# Patient Record
Sex: FEMALE | Race: WHITE
[De-identification: names, ages, dates, MRNs, and addresses within clinical notes are randomized per-mention and may not be internally consistent; named-entity substitution may affect disease eponyms.]

---

## 2017-01-02 ENCOUNTER — HOSPITAL ENCOUNTER (EMERGENCY)
Dept: HOSPITAL 45 - C.EDB | Age: 32
Discharge: HOME | End: 2017-01-02
Payer: COMMERCIAL

## 2017-01-02 VITALS
HEIGHT: 67.01 IN | HEIGHT: 67.01 IN | WEIGHT: 216.05 LBS | BODY MASS INDEX: 33.91 KG/M2 | BODY MASS INDEX: 33.91 KG/M2 | WEIGHT: 216.05 LBS

## 2017-01-02 VITALS — SYSTOLIC BLOOD PRESSURE: 118 MMHG | OXYGEN SATURATION: 98 % | HEART RATE: 96 BPM | DIASTOLIC BLOOD PRESSURE: 88 MMHG

## 2017-01-02 VITALS — TEMPERATURE: 98.24 F

## 2017-01-02 DIAGNOSIS — M54.16: Primary | ICD-10-CM

## 2017-01-02 DIAGNOSIS — F41.9: ICD-10-CM

## 2017-01-02 DIAGNOSIS — J45.909: ICD-10-CM

## 2017-01-02 DIAGNOSIS — F32.9: ICD-10-CM

## 2017-01-02 DIAGNOSIS — Z79.899: ICD-10-CM

## 2017-01-02 NOTE — EMERGENCY ROOM VISIT NOTE
History


First contact with patient:  13:04


Chief Complaint:  BACK PAIN


Stated Complaint:  LOW BACK PAIN,L LEG PAIN





History of Present Illness


The patient is a 31 year old female who presents to the Emergency Room with 

complaints of persistent lower back pain radiating into the left lower 

extremity.  The patient reports that she has already had an extensive workup 

for her back.  She most recently saw Dr. Mayfield in [place], along with 

Dr. Rico, both spine surgeons who did not suggest surgical intervention at 

this time.  Dr. Mayfield suggested massage therapy and aqua therapy for 

additional relief.  The patient has undergone prior THU 2 by Dr. Jhaveri.  She 

also was evaluated by the [facility] in October.  She is 

currently taking gabapentin, Lyrica and tramadol for her pain.  The patient 

denies any significant recent worsening symptoms, lower extremity weakness, 

foot drop, bladder/bowel incontinence or saddle paresthesias.  She is here to 

discuss further pain management options, and rates her discomfort a 6 out of 10.





Review of Systems


10 system review was performed and was negative except for pertinent positives 

and negatives as indicated in history of present illness





Past Medical/Surgical History


Medical Problems:


(1) Anxiety


(2) Asthma


(3) Bronchitis


(4) decreased fetal movement


(5) Depression


(6) intrau


Surgical Problems:


(1) History of tubal ligation


(2) History of wisdom tooth extraction








Family History





FH: cancer


FH: kidney disease


FH: seizures





Social History


Smoking Status:  Never Smoker


Alcohol Use:  none


Drug Use:  none


Marital Status:  


Housing Status:  lives with family


Occupation Status:  unemployed





Current/Historical Medications


Scheduled


Bupropion (Wellbutrin-Xl), 300 MG PO DAILY


Buspirone Hcl (Buspar), 15 MG PO BID


Pregabalin (Lyrica), 75 MG PO BID





Scheduled PRN


Clindamycin Hcl (Cleocin), Unknown Dose PO Q6H PRN for Pain


Cyclobenzaprine Hcl (Flexeril), 1 TAB PO TID PRN for Muscle Spasms


Ibuprofen (Motrin), 600 MG PO Q6H PRN for Pain


Oxycodone Ir (Roxicodone Ir), 1-2 TAB PO Q4H PRN for Pain





Allergies


Coded Allergies:  


     Penicillins (Verified  Allergy, Intermediate, HIVES, 1/2/17)


     Citalopram (Unverified  Allergy, Unknown, hives, 1/2/17)





Physical Exam


Vital Signs











  Date Time  Temp Pulse Resp B/P Pulse Ox O2 Delivery O2 Flow Rate FiO2


 


1/2/17 14:12  96 20 118/88 98   


 


1/2/17 12:26 36.8 114 18 140/89 98 Room Air  











Physical Exam


CONSTITUTIONAL:  Healthy and well nourished.  Alert and oriented X 3 with 

positive affect.  She does not appear in any acute distress.


HEENT:  Normocephalic, atraumatic.  Pupils equal, round and reactive.  


NECK:  Full active range of motion without discomfort.


RESPIRATORY:  Clear to auscultation bilaterally with no wheezing, crackles, 

rhonchi or stridor.


CARDIOVASCULAR:  Regular rate and rhythm with no murmurs, rubs or gallops.


GASTROINTESTINAL:  Bowel sounds present in all quadrants.  Soft and nontender 

to palpation.


MUSCULOSKELETAL: Examination shows mild left lower lumbar tenderness to 

palpation.  Negative logroll.  Negative sitting straight leg raise.  Pedal 

pulses are intact.  Ankle plantar/dorsiflexion strength is 5 out of 5 and 

symmetric bilaterally.


INTEGUMENTARY:  No rash or other significant dermatologic conditions noted.


NEUROLOGIC: Lower extremity deep tendon reflexes are 2+ and symmetric 

bilaterally.





Medical Decision & Procedures


ED Course


Patient history and physical exam were performed.  Nurse's notes were reviewed.

  I did review prior office notes from the pain clinic with date of service of 

10/14/16.  It was suggested that the patient continue with her current 

medications, and follow up with Dr. Mayfield for evaluation.  The patient has 

done this, and Dr. Mayfield is suggesting nonsurgical management for now.  At 

this point, I explained to the patient that she really should discuss further 

pain management issues with her PCP and the pain clinic.  She was offered a 

prescription for OxyIR 5 mg as needed for breakthrough pain, and encouraged to 

alternate ibuprofen and Tylenol for baseline pain relief.  I did explain to the 

patient that opioid pain management is not recommended given the chronic nature 

of her pain.  I did discuss the risk for addiction with continued chronic 

ongoing opioid use.  The patient voiced understanding, was happy with plan of 

care, refused any analgesics while in the emergency department, and rated her 

pain a 5 out of 10 at the time of discharge.





Medical Decision


See previous sections





[place] Drug Monitoring Program


Search Results:  patient reviewed within database, no issues identified





Impression





 Primary Impression:  Left lumbar radiculitis





Departure Information


Dispostion


Home / Self-Care





Prescriptions





Oxycodone Ir (Roxicodone Ir) 5 Mg Tab


1-2 TAB PO Q4H Y for Pain, #24 TAB


   For Initial Treatment


   Prov: Sanjiv Tidwell PA         1/2/17





Referrals


Yakov Lam M.D.





Forms


HOME CARE DOCUMENTATION FORM,                                                 

               IMPORTANT VISIT INFORMATION





Patient Instructions


A Signature Page, My [facility], ED Sciatica





Additional Instructions





Continue with your current medications.


Ibuprofen 800 mg and/or Tylenol 1000 mg every 8 hours.


You may also alternate these medications for more effective pain relief:


Ibuprofen --4 HRS--> Tylenol --4 HRS--> ibuprofen --4 HRS--> Tylenol ....


OxyIR if needed for worse pain.


Discuss further treatment options with your PCP and [facility].

## 2017-02-26 ENCOUNTER — HOSPITAL ENCOUNTER (EMERGENCY)
Dept: HOSPITAL 45 - C.EDB | Age: 32
Discharge: HOME | End: 2017-02-26
Payer: COMMERCIAL

## 2017-02-26 VITALS
WEIGHT: 221.34 LBS | BODY MASS INDEX: 34.74 KG/M2 | HEIGHT: 67.01 IN | WEIGHT: 221.34 LBS | HEIGHT: 67.01 IN | BODY MASS INDEX: 34.74 KG/M2

## 2017-02-26 VITALS — OXYGEN SATURATION: 98 % | DIASTOLIC BLOOD PRESSURE: 89 MMHG | HEART RATE: 107 BPM | SYSTOLIC BLOOD PRESSURE: 133 MMHG

## 2017-02-26 VITALS — TEMPERATURE: 98.96 F

## 2017-02-26 DIAGNOSIS — Z79.52: ICD-10-CM

## 2017-02-26 DIAGNOSIS — Z79.899: ICD-10-CM

## 2017-02-26 DIAGNOSIS — M54.42: Primary | ICD-10-CM

## 2017-02-26 DIAGNOSIS — G89.29: ICD-10-CM

## 2017-02-26 NOTE — EMERGENCY ROOM VISIT NOTE
ED Visit Note


First contact with patient:  14:59


CHIEF COMPLAINT:  Low back pain





HISTORY OF PRESENT ILLNESS:  This 31-year-old female patient presents to the 

emergency department complaining of pain in the low back which began worsening 

yesterday.  The pain was gradual in onset, is now constant and worse with 

movement.  The patient notes the pain as dull and a 7/10.  The patient has 

taken Flexeril and tramadol without relief of the pain.  The patient denies any 

loss of control of their bowel or bladder functions.  There has been no leg 

numbness or weakness, and no change in sensation.  No nausea or vomiting or 

abdominal pain.  No chest pain or shortness of breath.   No dysuria or 

increased urinary frequency.  The patient has a history of ongoing back issues.

  She states that she is a surgical candidate, but is not able to undergo 

surgery because of her family situation.  The patient has followed with 

orthospine and pain management.  She states her last MRI was about 2 weeks ago 

which showed bulging of L5-S1 with impingement.  The patient does not have new 

trauma, and feels this is similar to her chronic pain when it is exacerbated.





REVIEW OF SYSTEMS:   A review of systems was performed with positives and 

pertinent negatives listed in the history of present illness. All other systems 

were reviewed and are negative.





ALLERGIES: See EMR





MEDICATIONS: Tramadol and Flexeril





PMH: History of chronic back pain





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


  


PHYSICAL EXAM: 


VITALS: Vitals are noted on the nurse's note and reviewed by myself.  Vital 

signs stable.


GENERAL: White female, in no acute distress, nondiaphoretic, well-developed well

-nourished.


SKIN: The skin was without rashes, erythema, edema, or bruising.  Capillary 

refill less than 2 seconds.    


NECK: Supple without nuchal rigidity.  No cervical spine tenderness.  No 

paraspinous muscle tenderness.    


HEART: Regular rate and rhythm without murmurs gallops or rubs.


LUNGS: Clear to auscultation bilaterally without wheezes, rales or rhonchi.  


ABDOMEN: Positive bowel sounds x 4.  Normal tympanic percussion.  Soft, 

nontender, without masses or organomegaly.  Morris sign negative.


MUSCULOSKELETAL: No muscle atrophy, erythema, or edema noted of the back.  

There is left sided tenderness over the lumbar spinous processes.  There is no 

significant tenderness over the paraspinous muscles bilateral.  There is no 

tenderness over the thoracic spine or paraspinous muscles.  There are no muscle 

spasms present.  The patient is slow to move around with maximum tenderness 

with flexion.  Positive straight leg raise test.


NEURO: Patient was alert and oriented to person place and time.  Normal 

sensation to light and sharp touch.  Deep tendon reflexes 2+ in the lower 

extremities.  Dorsalis pedis pulse 2+ bilaterally.  Strength 5/5 and equal in 

the bilateral lower extremities.





EMERGENCY DEPARTMENT COURSE: Physical exam and history were performed.  Nursing 

notes and EMR were reviewed.  Evidently the patient has a long-standing history 

of back pain, and states that she is a surgical candidate.  She has been seen a 

few times in her lifetime at this facility for back pain.  She states that she 

had an MRI 2 weeks ago, and without acute traumatic injury I do not feel 

additional imaging is necessary at this time.  She certainly does not present 

like cauda equina, spinal abscess, or other acute surgical process.  The 

patient and I had a lengthy discussion regarding options of care.  I will 

provide her a short course of Vicodin and prednisone.  She is to use her 

Flexeril at home.  I did review her profile in the [place] drug monitoring 

program, and she does receive small amounts of tramadol through her primary 

care physician's office.  I do not see any suspicious seeking behavior at this 

time.  The patient was asked to follow-up with her pain management specialist 

and her primary care physician/orthopedic surgeon.  She was invited back to the 

ER anytime with any new, worsening, or concerning symptoms.


Current/Historical Medications


Scheduled


Bupropion (Wellbutrin-Xl), 300 MG PO DAILY


Buspirone Hcl (Buspar), 15 MG PO BID


Prednisone (Prednisone), 0 PO DAILY


Pregabalin (Lyrica), 75 MG PO BID





Scheduled PRN


Clindamycin Hcl (Cleocin), Unknown Dose PO Q6H PRN for Pain


Cyclobenzaprine Hcl (Flexeril), 1 TAB PO TID PRN for Muscle Spasms


Hydrocodone/Acetaminophen 5MG/325MG (Norco 5MG/325MG), 1 TABLET PO Q6 PRN for 

Pain


Ibuprofen (Motrin), 600 MG PO Q6H PRN for Pain


Oxycodone Ir (Roxicodone Ir), 1-2 TAB PO Q4H PRN for Pain





Allergies


Coded Allergies:  


     Penicillins (Verified  Allergy, Intermediate, HIVES, 1/2/17)


     Citalopram (Unverified  Allergy, Unknown, hives, 1/2/17)


     Pregabalin (Verified  Adverse Reaction, Intermediate, Water retention, 

difficulty breathing, 2/26/17)





Vital Signs











  Date Time  Temp Pulse Resp B/P Pulse Ox O2 Delivery O2 Flow Rate FiO2


 


2/26/17 14:44 37.2 123 18 132/91 100 Room Air  











Departure Information


Impression





 Primary Impression:  


 Left-sided low back pain with sciatica





Dispostion


Home / Self-Care





Condition


FAIR





Prescriptions





Prednisone (Prednisone) 20 Mg Tab


0 PO DAILY, #18 TAB


   3 DAILY FOR 3 DAYS, THEN 2 DAILY FOR 3 DAYS, THEN 1 DAILY FOR 3 DAYS.


   Prov: Ferny Gurrola PA-C         2/26/17 


Hydrocodone/Acetaminophen 5MG/325MG (Norco 5MG/325MG)  Tab


1 TABLET PO Q6 Y for Pain, #12 TAB


   For Initial Treatment


   Prov: Ferny Gurrola PA-C         2/26/17





Forms


HOME CARE DOCUMENTATION FORM,                                                 

               IMPORTANT VISIT INFORMATION





Patient Instructions


My [facility]





Additional Instructions





You were seen and evaluated today on an emergency basis only.  This is not a 

substitute for, or an effort to provide, complete comprehensive medical care.  

It is not possible to recognize and treat all injuries or illnesses in a single 

emergency department visit. For this reason it is recommended that you followup 

with your orthopedic spine specialist and pain management specialist for 

ongoing care and evaluation.





For baseline pain relief you may alternate ibuprofen and acetaminophen every 4 

hours for pain control. Take 600 mg ibuprofen (Advil) and then 4 hours later 

take 1000 mg acetaminophen (Tylenol). Do not take more than 3000 mg 

acetaminophen in a single day.  





Norco (hydrocodone/acetaminophen) 5/325 mg every 6 hours as needed for 

worsening breakthrough pain. Do not drink or drive on Norco. This medication 

will likely make you tired. Do not take Norco and Tylenol at the same time as 

both contain acetaminophen. Norco may cause constipation. You may wish to take 

an over-the-counter stool softener like Colace if this occurs.





Take prednisone as prescribed.





Continue Flexeril at home as previously provided





You are welcome to return to the emergency department anytime with new, 

worsening, or concerning symptoms.

## 2017-05-18 ENCOUNTER — HOSPITAL ENCOUNTER (OUTPATIENT)
Dept: HOSPITAL 45 - C.PAPS | Age: 32
Discharge: HOME | End: 2017-05-18
Attending: PHYSICIAN ASSISTANT
Payer: COMMERCIAL

## 2017-05-18 ENCOUNTER — HOSPITAL ENCOUNTER (OUTPATIENT)
Dept: HOSPITAL 45 - C.LAB1850 | Age: 32
Discharge: HOME | End: 2017-05-18
Attending: PHYSICIAN ASSISTANT
Payer: COMMERCIAL

## 2017-05-18 DIAGNOSIS — Z12.4: Primary | ICD-10-CM

## 2017-05-18 DIAGNOSIS — N92.0: Primary | ICD-10-CM

## 2017-05-18 LAB
BASOPHILS # BLD: 0.05 K/UL (ref 0–0.2)
BASOPHILS NFR BLD: 0.8 %
COMPLETE: YES
EOSINOPHIL NFR BLD AUTO: 210 K/UL (ref 130–400)
HCT VFR BLD CALC: 39.7 % (ref 37–47)
IG%: 0.3 %
IMM GRANULOCYTES NFR BLD AUTO: 31.9 %
LYMPHOCYTES # BLD: 2.1 K/UL (ref 1.2–3.4)
MCH RBC QN AUTO: 30.8 PG (ref 25–34)
MCHC RBC AUTO-ENTMCNC: 35 G/DL (ref 32–36)
MCV RBC AUTO: 87.8 FL (ref 80–100)
MONOCYTES NFR BLD: 8.2 %
NEUTROPHILS # BLD AUTO: 6.2 %
NEUTROPHILS NFR BLD AUTO: 52.6 %
PMV BLD AUTO: 10.7 FL (ref 7.4–10.4)
RBC # BLD AUTO: 4.52 M/UL (ref 4.2–5.4)
WBC # BLD AUTO: 6.59 K/UL (ref 4.8–10.8)

## 2017-09-06 ENCOUNTER — HOSPITAL ENCOUNTER (OUTPATIENT)
Dept: HOSPITAL 45 - C.LABBC | Age: 32
Discharge: HOME | End: 2017-09-06
Attending: ORTHOPAEDIC SURGERY
Payer: SELF-PAY

## 2017-09-06 DIAGNOSIS — Z01.812: Primary | ICD-10-CM

## 2017-09-06 LAB
BASOPHILS # BLD: 0.04 K/UL (ref 0–0.2)
BASOPHILS NFR BLD: 0.5 %
COMPLETE: YES
EOSINOPHIL NFR BLD AUTO: 194 K/UL (ref 130–400)
HCT VFR BLD CALC: 40.6 % (ref 37–47)
IG%: 0.3 %
IMM GRANULOCYTES NFR BLD AUTO: 23.3 %
LYMPHOCYTES # BLD: 1.8 K/UL (ref 1.2–3.4)
MCH RBC QN AUTO: 30.4 PG (ref 25–34)
MCHC RBC AUTO-ENTMCNC: 33.7 G/DL (ref 32–36)
MCV RBC AUTO: 90.2 FL (ref 80–100)
MONOCYTES NFR BLD: 7.5 %
NEUTROPHILS # BLD AUTO: 4 %
NEUTROPHILS NFR BLD AUTO: 64.4 %
PMV BLD AUTO: 11.5 FL (ref 7.4–10.4)
RBC # BLD AUTO: 4.5 M/UL (ref 4.2–5.4)
WBC # BLD AUTO: 7.72 K/UL (ref 4.8–10.8)

## 2017-09-18 ENCOUNTER — HOSPITAL ENCOUNTER (INPATIENT)
Dept: HOSPITAL 45 - C.ACU | Age: 32
LOS: 2 days | Discharge: HOME | DRG: 460 | End: 2017-09-20
Attending: ORTHOPAEDIC SURGERY | Admitting: ORTHOPAEDIC SURGERY
Payer: COMMERCIAL

## 2017-09-18 VITALS — HEART RATE: 101 BPM | SYSTOLIC BLOOD PRESSURE: 128 MMHG | DIASTOLIC BLOOD PRESSURE: 80 MMHG | OXYGEN SATURATION: 100 %

## 2017-09-18 VITALS
HEART RATE: 105 BPM | SYSTOLIC BLOOD PRESSURE: 117 MMHG | TEMPERATURE: 98.42 F | DIASTOLIC BLOOD PRESSURE: 61 MMHG | OXYGEN SATURATION: 99 %

## 2017-09-18 VITALS
HEIGHT: 67 IN | BODY MASS INDEX: 30.45 KG/M2 | BODY MASS INDEX: 30.45 KG/M2 | HEIGHT: 67 IN | WEIGHT: 194.01 LBS | BODY MASS INDEX: 30.45 KG/M2 | WEIGHT: 194.01 LBS

## 2017-09-18 VITALS
TEMPERATURE: 98.42 F | OXYGEN SATURATION: 100 % | DIASTOLIC BLOOD PRESSURE: 75 MMHG | SYSTOLIC BLOOD PRESSURE: 114 MMHG | HEART RATE: 118 BPM

## 2017-09-18 VITALS
DIASTOLIC BLOOD PRESSURE: 77 MMHG | SYSTOLIC BLOOD PRESSURE: 125 MMHG | HEART RATE: 116 BPM | OXYGEN SATURATION: 100 % | TEMPERATURE: 98.06 F

## 2017-09-18 VITALS
OXYGEN SATURATION: 100 % | DIASTOLIC BLOOD PRESSURE: 74 MMHG | SYSTOLIC BLOOD PRESSURE: 127 MMHG | TEMPERATURE: 98.06 F | HEART RATE: 111 BPM

## 2017-09-18 VITALS
DIASTOLIC BLOOD PRESSURE: 68 MMHG | HEART RATE: 88 BPM | OXYGEN SATURATION: 100 % | TEMPERATURE: 98.24 F | SYSTOLIC BLOOD PRESSURE: 113 MMHG

## 2017-09-18 VITALS
DIASTOLIC BLOOD PRESSURE: 69 MMHG | OXYGEN SATURATION: 100 % | SYSTOLIC BLOOD PRESSURE: 118 MMHG | TEMPERATURE: 98.06 F | HEART RATE: 97 BPM

## 2017-09-18 VITALS
TEMPERATURE: 97.34 F | SYSTOLIC BLOOD PRESSURE: 100 MMHG | DIASTOLIC BLOOD PRESSURE: 64 MMHG | OXYGEN SATURATION: 100 % | HEART RATE: 99 BPM

## 2017-09-18 VITALS
SYSTOLIC BLOOD PRESSURE: 115 MMHG | HEART RATE: 110 BPM | TEMPERATURE: 98.24 F | DIASTOLIC BLOOD PRESSURE: 58 MMHG | OXYGEN SATURATION: 100 %

## 2017-09-18 VITALS — OXYGEN SATURATION: 100 %

## 2017-09-18 DIAGNOSIS — Z79.899: ICD-10-CM

## 2017-09-18 DIAGNOSIS — F41.9: ICD-10-CM

## 2017-09-18 DIAGNOSIS — M53.2X7: ICD-10-CM

## 2017-09-18 DIAGNOSIS — M51.37: Primary | ICD-10-CM

## 2017-09-18 DIAGNOSIS — F32.9: ICD-10-CM

## 2017-09-18 PROCEDURE — 0ST40ZZ RESECTION OF LUMBOSACRAL DISC, OPEN APPROACH: ICD-10-PCS | Performed by: ORTHOPAEDIC SURGERY

## 2017-09-18 PROCEDURE — 0SG30A1: ICD-10-PCS | Performed by: ORTHOPAEDIC SURGERY

## 2017-09-18 RX ADMIN — ACETAMINOPHEN SCH MLS/HR: 10 INJECTION, SOLUTION INTRAVENOUS at 23:35

## 2017-09-18 RX ADMIN — ACETAMINOPHEN SCH MLS/HR: 10 INJECTION, SOLUTION INTRAVENOUS at 15:47

## 2017-09-18 RX ADMIN — FENTANYL CITRATE PRN MCG: 50 INJECTION, SOLUTION INTRAMUSCULAR; INTRAVENOUS at 12:30

## 2017-09-18 RX ADMIN — SODIUM CHLORIDE SCH MG: 9 INJECTION INTRAMUSCULAR; INTRAVENOUS; SUBCUTANEOUS at 21:48

## 2017-09-18 RX ADMIN — SODIUM CHLORIDE SCH MLS/HR: 900 INJECTION, SOLUTION INTRAVENOUS at 23:34

## 2017-09-18 RX ADMIN — TOPIRAMATE SCH MG: 25 TABLET, FILM COATED ORAL at 21:46

## 2017-09-18 RX ADMIN — FENTANYL CITRATE PRN MCG: 50 INJECTION, SOLUTION INTRAMUSCULAR; INTRAVENOUS at 12:39

## 2017-09-18 RX ADMIN — CLINDAMYCIN PHOSPHATE SCH MLS/HR: 150 INJECTION, SOLUTION INTRAMUSCULAR; INTRAVENOUS at 17:55

## 2017-09-18 RX ADMIN — HYDROMORPHONE HYDROCHLORIDE PRN MG: 1 INJECTION, SOLUTION INTRAMUSCULAR; INTRAVENOUS; SUBCUTANEOUS at 14:53

## 2017-09-18 RX ADMIN — MORPHINE SULFATE PRN MG: 10 INJECTION, SOLUTION INTRAMUSCULAR; INTRAVENOUS at 13:03

## 2017-09-18 RX ADMIN — SODIUM CHLORIDE SCH MG: 9 INJECTION INTRAMUSCULAR; INTRAVENOUS; SUBCUTANEOUS at 15:48

## 2017-09-18 RX ADMIN — SODIUM CHLORIDE SCH MLS/HR: 900 INJECTION, SOLUTION INTRAVENOUS at 14:50

## 2017-09-18 RX ADMIN — HYDROMORPHONE HYDROCHLORIDE PRN MG: 1 INJECTION, SOLUTION INTRAMUSCULAR; INTRAVENOUS; SUBCUTANEOUS at 20:02

## 2017-09-18 RX ADMIN — FENTANYL CITRATE PRN MCG: 50 INJECTION, SOLUTION INTRAMUSCULAR; INTRAVENOUS at 12:19

## 2017-09-18 RX ADMIN — FENTANYL CITRATE PRN MCG: 50 INJECTION, SOLUTION INTRAMUSCULAR; INTRAVENOUS at 12:24

## 2017-09-18 RX ADMIN — DEXAMETHASONE SODIUM PHOSPHATE SCH MLS/MIN: 4 INJECTION, SOLUTION INTRA-ARTICULAR; INTRALESIONAL; INTRAMUSCULAR; INTRAVENOUS; SOFT TISSUE at 17:52

## 2017-09-18 RX ADMIN — MORPHINE SULFATE PRN MG: 10 INJECTION, SOLUTION INTRAMUSCULAR; INTRAVENOUS at 13:08

## 2017-09-18 NOTE — DIAGNOSTIC IMAGING REPORT
SPINE ONE VIEW, ANY LEVEL



CLINICAL HISTORY: L5-S1 FUSION/INTERBODY    



COMPARISON STUDY:  Lumbar spine MRI February 3, 2017.



Fluoroscopy time: 13 seconds.



FINDINGS: Single lateral fluoroscopic image demonstrates an L5-S1 discectomy

with interbody spacer placement. There are pedicle screws at the L5 and S1

levels.



IMPRESSION:  Lateral fluoroscopic image demonstrating an L5-S1 discectomy with

L5 and S1 pedicle screws.









Electronically signed by:  Akira Mccrary M.D.

9/18/2017 11:44 AM



Dictated Date/Time:  9/18/2017 11:43 AM

## 2017-09-18 NOTE — ANESTHESIOLOGY PROGRESS NOTE
Anesthesia Post Op Note


Date & Time


Sep 18, 2017 at 14:29





Vital Signs





Vital Signs Past 12 Hours








  Date Time  Temp Pulse Resp B/P (MAP) Pulse Ox O2 Delivery O2 Flow Rate FiO2


 


9/18/17 14:23     100 Nasal Cannula 4.0 


 


9/18/17 13:50     100 Nasal Cannula 4.0 


 


9/18/17 13:50 36.7 116 16 125/77 (93) 100 Nasal Cannula 4.0 


 


9/18/17 13:35  118 18 130/71 100 Nasal Cannula 4 


 


9/18/17 13:20 36.4 121 18 133/83 100 Nasal Cannula 4 


 


9/18/17 13:10  121 18 125/78 100 Oxymask 10 


 


9/18/17 13:00  118 18 129/71 100 Nasal Cannula 4 


 


9/18/17 12:50  106 18 137/71 100 Oxymask 10 


 


9/18/17 12:40  85 18 150/62 100 Oxymask 10 


 


9/18/17 12:30 36.4 110 18 143/75 100 Oxymask 10 


 


9/18/17 12:20  113 18 132/67 100 Oxymask 10 


 


9/18/17 12:14 36.1 127 18 108/65 100 Oxymask 10 


 


9/18/17 08:08 36.9 118 18 114/75 (88) 100 Room Air  











Notes


Mental Status:  alert / awake / arousable, participated in evaluation


Pt Amnestic to Procedure:  Yes


Nausea / Vomiting:  adequately controlled


Pain:  adequately controlled


Airway Patency, RR, SpO2:  stable & adequate


BP & HR:  stable & adequate


Hydration State:  stable & adequate


Anesthetic Complications:  no major complications apparent

## 2017-09-18 NOTE — HISTORY AND PHYSICAL
History & Physical


Date


Sep 18, 2017.





Chief Complaint


Back and lower extremity difficulty paresthesias inability to function and 

inability to lift and move her children and function appropriately





History of Present Illness


The patient is a 32 year old female with complaints of back and significant 

lower extremity difficulty mostly back pain dominant over leg pain





Past Medical/Surgical History


Medical Problems:


(1) Anxiety


(2) Asthma


(3) Bronchitis


(4) decreased fetal movement


(5) Depression


(6) intrau


Surgical Problems:


(1) History of tubal ligation


(2) History of wisdom tooth extraction





Additional History


Hepatic Disease:  No


Endocrine Disorder:  No


Kidney Disease:  No


Hypertension:  No


Heart Disease:  No


Bleeding Tendencies:  No


Infectious Diseases:  No





Allergies


Coded Allergies:  


     Penicillins (Verified  Allergy, Intermediate, HIVES, 9/18/17)


     Citalopram (Unverified  Allergy, Unknown, hives, 9/18/17)


     Pregabalin (Verified  Adverse Reaction, Intermediate, Water retention, 

difficulty breathing, 9/18/17)





Home Medications


Scheduled


Bupropion Hcl (Bupropion Hcl Xl), 150 MG PO QAM


Buspirone Hcl (Buspirone Hcl), 5 MG PO BID


Topiramate (Topamax), 50 MG PO BID





Scheduled PRN


Albuterol (Ventolin Hfa), 2 PUFFS INH UD PRN for SOB/Wheezing


Cyclobenzaprine Hcl (Flexeril), 15 MG PO TID PRN for Muscle Spasm


Hydrocodone/Acetaminophen 10MG/325MG (Norco 10MG/325MG), 1-2 TABS PO Q6H PRN 

for Pain


Ibuprofen (Advil), 400-600 MG PO Q6H PRN for Pain





Physical Examination


Skin:  warm/dry


Eyes:  normal inspection


ENT:  normal ENT inspection


Head:  normocephalic


Neck:  supple


Respiratory/Chest:  lungs clear


Cardiovascular:  regular rate, rhythm


Abdomen / GI:  normal bowel sounds


Back:  normal inspection


Extremities:  normal inspection


Genitourinary - Female:  external genitalia normal


Neurologic/Psych:  no motor/sensory deficits





Diagnosis


Degenerative and unstable disc segment of the spine at L5-S1


ASA Classification:  ASA Class II





Plan of Treatment


Posterior lumbar interbody fusion L5-S1

## 2017-09-18 NOTE — OPERATIVE REPORT
DATE OF OPERATION:  09/18/2017

 

PREOPERATIVE DIAGNOSIS:  Instability, lumbar spine L5-S1, severely

degenerative disk L5-S1, lumbar spine.

 

PROCEDURE:  Posterior lumbar interbody fusion, L5-S1, decompression of nerve

roots.

 

We used 2 bilateral cage implants and polyethylene ketone placed in the

interval at L5-S1 locked down with a pedicle screw construct single level, L5

to the sacrum.  We also bone grafted out over the transverse processes to

complete the 360 fusion.

 

SURGEON:  Dr. Wharton.

 

ASSISTANT:  Eduard Edmond PA-C.

 

COMPLICATIONS:  Zero.

 

BLOOD LOSS:  Less than 50.

 

COMORBIDITIES:  Included obesity.

 

DESCRIPTION OF PROCEDURE:  The patient was taken to the operating room, a

general intubated anesthetic provided to the patient, placed prone, scrubbed,

prepped and draped sterile.

 

We commenced with surgery, we made a skin incision and fascia incision.  We

came down right at the interspace of L5-S1, putting in deep self-retaining

retractor.  We decompressed the neural elements which will be the 5th and S1

nerve roots bilaterally taking off ligamentum and lamina, doing a complete

laminectomy, partial facetectomy.

 

We safely instrumented the spine.  I was able to get pedicle screws safely

placed and anatomically located at the sacrum and L5 on the left and sacrum

and L5 on the right, very pleased with the positioning.

 

We then retracted the dura left side, right side and did an interbody bone

grafting, complete discectomy was offered, plus an interbody device placed at

L5-S1 bilateral.

 

We tightened down the construct.  We bone grafted out over the transverse

processes.  Prior to this, we had irrigated with approximately 600 mL of

fluid.  We spread vancomycin in the deep layer and the superficial layer,

closed in layers over a Hemovac drain.  Sterile dressings applied.  The

patient returned to PACU stable.

 

Sponge and needle count correct.

 

No complications.

 

 

I attest to the content of the Intraoperative Record and any orders documented therein. Any exception
s are noted below.

## 2017-09-18 NOTE — [facility] POST OPERATIVE BRIEF NOTE
Immediate Operative Summary


Operative Date


Sep 18, 2017.





Pre-Operative Diagnosis





Degenerative and unstable disc segment of the spine at L5-S1





Post-Operative Diagnosis





Degenerative and unstable disc segment of the spine at L5-S1





Procedure(s) Performed





L5-S1 Posterior Lumbar Interbody Fusion





Surgeon


Dr. Wharton





Assistant Surgeon(s)


dEuard Edmond PA-C





Estimated Blood Loss


50ml





Findings


instability





Specimens





none per surgeon





Disposition


Recovery Room / PACU

## 2017-09-19 VITALS
SYSTOLIC BLOOD PRESSURE: 128 MMHG | OXYGEN SATURATION: 98 % | HEART RATE: 80 BPM | TEMPERATURE: 98.24 F | DIASTOLIC BLOOD PRESSURE: 77 MMHG

## 2017-09-19 VITALS
DIASTOLIC BLOOD PRESSURE: 69 MMHG | HEART RATE: 78 BPM | SYSTOLIC BLOOD PRESSURE: 111 MMHG | OXYGEN SATURATION: 97 % | TEMPERATURE: 97.7 F

## 2017-09-19 VITALS
OXYGEN SATURATION: 100 % | HEART RATE: 105 BPM | TEMPERATURE: 98.24 F | DIASTOLIC BLOOD PRESSURE: 63 MMHG | SYSTOLIC BLOOD PRESSURE: 104 MMHG

## 2017-09-19 VITALS
TEMPERATURE: 98.24 F | HEART RATE: 116 BPM | OXYGEN SATURATION: 100 % | SYSTOLIC BLOOD PRESSURE: 112 MMHG | DIASTOLIC BLOOD PRESSURE: 65 MMHG

## 2017-09-19 VITALS
OXYGEN SATURATION: 100 % | HEART RATE: 110 BPM | SYSTOLIC BLOOD PRESSURE: 105 MMHG | DIASTOLIC BLOOD PRESSURE: 69 MMHG | TEMPERATURE: 97.52 F

## 2017-09-19 RX ADMIN — HYDROMORPHONE HYDROCHLORIDE PRN MG: 1 INJECTION, SOLUTION INTRAMUSCULAR; INTRAVENOUS; SUBCUTANEOUS at 08:16

## 2017-09-19 RX ADMIN — DEXAMETHASONE SODIUM PHOSPHATE SCH MLS/MIN: 4 INJECTION, SOLUTION INTRA-ARTICULAR; INTRALESIONAL; INTRAMUSCULAR; INTRAVENOUS; SOFT TISSUE at 18:39

## 2017-09-19 RX ADMIN — DEXAMETHASONE SODIUM PHOSPHATE SCH MLS/MIN: 4 INJECTION, SOLUTION INTRA-ARTICULAR; INTRALESIONAL; INTRAMUSCULAR; INTRAVENOUS; SOFT TISSUE at 09:10

## 2017-09-19 RX ADMIN — ACETAMINOPHEN SCH MLS/HR: 10 INJECTION, SOLUTION INTRAVENOUS at 08:17

## 2017-09-19 RX ADMIN — Medication SCH GM: at 08:17

## 2017-09-19 RX ADMIN — SODIUM CHLORIDE SCH MG: 9 INJECTION INTRAMUSCULAR; INTRAVENOUS; SUBCUTANEOUS at 09:11

## 2017-09-19 RX ADMIN — TOPIRAMATE SCH MG: 25 TABLET, FILM COATED ORAL at 20:52

## 2017-09-19 RX ADMIN — HYDROMORPHONE HYDROCHLORIDE PRN MG: 1 INJECTION, SOLUTION INTRAMUSCULAR; INTRAVENOUS; SUBCUTANEOUS at 15:28

## 2017-09-19 RX ADMIN — SODIUM CHLORIDE SCH MG: 9 INJECTION INTRAMUSCULAR; INTRAVENOUS; SUBCUTANEOUS at 03:53

## 2017-09-19 RX ADMIN — CLINDAMYCIN PHOSPHATE SCH MLS/HR: 150 INJECTION, SOLUTION INTRAMUSCULAR; INTRAVENOUS at 01:51

## 2017-09-19 RX ADMIN — SODIUM CHLORIDE SCH MG: 9 INJECTION INTRAMUSCULAR; INTRAVENOUS; SUBCUTANEOUS at 15:42

## 2017-09-19 RX ADMIN — TOPIRAMATE SCH MG: 25 TABLET, FILM COATED ORAL at 08:18

## 2017-09-19 RX ADMIN — DEXAMETHASONE SODIUM PHOSPHATE SCH MLS/MIN: 4 INJECTION, SOLUTION INTRA-ARTICULAR; INTRALESIONAL; INTRAMUSCULAR; INTRAVENOUS; SOFT TISSUE at 01:51

## 2017-09-19 RX ADMIN — BUPROPION HYDROCHLORIDE SCH MG: 150 TABLET, FILM COATED, EXTENDED RELEASE ORAL at 08:18

## 2017-09-19 RX ADMIN — ACETAMINOPHEN SCH MLS/HR: 10 INJECTION, SOLUTION INTRAVENOUS at 15:42

## 2017-09-19 NOTE — DISCHARGE INSTRUCTIONS
Discharge Instructions


Date of Service


Sep 19, 2017.





Admission


Reason for Admission:  Degenerative Disc Disease





Discharge


Discharge Diagnosis / Problem:  instability L5-S1





Discharge Goals


Goal(s):  Improve function





Activity Recommendations


Activity Limitations:  as noted below


Lifting Limitations:  until after follow-up appointment


Exercise/Sports Limitations:  until after follow-up appointment


May Resume Sexual Activity:  after follow-up appointment


Shower/Bathe:  keep incision dry





.





Instructions / Follow-Up


Instructions / Follow-Up


MEDICATIONS:





Please take your prescriptions as instructed at your pre-op appointment.








SPECIAL CARE:





The following information is intended to answer some of the common questions 

and concerns regarding your surgery.  


Each patient is an individual and receives individual counselling throughout 

the course of treatment, from diagnosis to surgery all the way through 

recovery.  


What follows is not an exhaustive list, but should be a useful guide to some of 

the common questions and concerns patients have regarding their surgeries.


These are not provided to keep you from calling us; rather, they give you 

something accurate and concrete to reference as you recover from your procedure.


 If you need us, we are available to you.





As always, if you are not sure about something, call us at [phone].





MEDICAL EMERGENCIES:





For these conditions, call 911 or go to your local hospital-based Emergency 

Department - not [facility] or equivalent.





*  Paralysis


*  Severe chest pain or difficulty breathing


*  Swelling or redness of either leg





Spine procedures can be rather complex and though complications are rare, they 

do occur.  In such cases, effective advice regarding emergency situations 

cannot always 


be addressed over the telephone.  You may be referred to the emergency 

department for more effective management of your problem.





Activity Limitations:





It is important to give your body time to heal, so please limit your activities

:  





* In general, don't do anything that moves your spine too much.  You should 

avoid contact sports, twisting or heavy lifting while you recover.





* 5-10 pounds is all you should attempt to lift.





* You should not plan on driving for approximately 3 weeks and you should avoid

  traveling more than 30-45 minutes at a time.  


   Longer trips should be broken down with walking breaks spaced appropriately.





* Physical therapy is not usually required.





* Walking and good posture practices will help you recover and regain your 

function.





* Avoid straining or sudden changes in position.





* In general, the goal is to take it easy and recover.  Don't cause any new 

problems.  Just relax.





Showers:





* Do not take a bath, use a Jacuzzi or hot tub or otherwise submerge your 

incision.


 


* It is usually safe to take a shower 4-5 days after your surgery.





* Your incision does not require any special creams or ointments.





* Simply clean it with soap and water, dry and re-dress with a clean bandage 

afterwards.





Incision:





* Keep incision clean, dry and protected until your first follow-up appointment.





* Some amount of drainage and redness is normal.  Any drainage should be fairly 

clear and not have a foul odor.





* If you feel anything is wrong or you have excessive drainage, please call us.





* Your stitches and staples will be removed 10-14 days after your surgery.  At 

the  time of your first post-op visit.





* Neck surgeries are typically closed with a suture underneath the skin.  The 

steri-strips over the incision should be maintained until we see you in the 

office.





Bracing:





* You may be provided with a back or neck brace to encourage good posture and  

prevent injury.  


   It will remind you not to do too much as you heal and will alert others to 

the fact that you have had a surgery.





* Back braces may be removed for showers and when you are resting at home.  

They must be worn when you are walking around for any period of time or for 

travel.





* For neck surgery, you will likely be provided with two cervical collars.  The 

soft collar  (Aspen or foam rubber) is worn most commonly throughout the day 

and while sleeping.


  The plastic collar (provided at the hospital) is for showering/bathing.





* Except while eating, collars should remain in place.  More specifically, 

bracing is provided for a purpose and should be worn.





* Please obtain your brace or collars prior to your operation and bring them to 

the hospital with you on the day of surgery. 





*  You should also bring your collars to your post-op appointment with Dr. Wharton.








You should always take good care of your body and practice healthy habits, 

especially following surgery. 





You should:





* Follow your doctor's treatment plan





* Sit and stand properly with good posture (ears over shoulders, shoulders over 

hips)   Don't slouch





* Learn to lift correctly





* Exercise regularly (low-impact aerobic exercise is especially good, but check 

with your doctor first)





* Generally, be up and walking for 5-10 minutes at a time at least 3-4 times 

per day from the day you get home





* Increasing walking to tolerance until you can walk for 20-30 minutes at a time





* Attain and maintain a healthy body weight





* Eat healthy foods ( a well-balanced, low-fat diet rich in fruits and 

vegetables) and get enough calcium





* Avoid excessive use of alcohol





When to call our office - If you notice any of the following:





* Increased pain not relieve by pain medicine





* Fevers greater then 100 degrees F, chills or flu symptoms





* Increased redness around incision





* Drainage from the incision that is not clear


* Any foul smelling drainage





* Swelling or fluid collection beneath the skin





Miscellaneous:





* In the hospital, you may be given a walker or cane for support while walking.

  These


  are temporary needs and are intended to prevent injuries due to falls.  You 

may 


  discontinue them when you feel strong and steady enough on your feet.





* Sleep in a comfortable position.  We find that many patients find a lounge 

chair or 


  recliner with several pillows to be beneficial in the early post-operative 

period.





* The support stockings should be used for 7-10 days and may be discontinued


  when you are back to walking more and conducting usual household activities.





No problem is insignificant.  We are here to help you and get you well.  


Contact us at [phone].





Definitions:





Foraminotomy: If part of the disc or a bone spur (osteophyte) is pressing on a 

nerve 


as it leaves the vertebra (through an exit called the foramen), a foraminotomy 

may be


done.  Otomy means "to make an opening."  A foraminotomy is making the opening 


of the foramen larger, so the nerve can exit without being compressed.





Laminotomy: Similar to the foraminotomy, a laminotomy makes a larger opening, 


this time in your bony plate protecting your spinal canal and spinal cord (the 

lamina).


The lamina may be pressing on your nerve, so the surgeon may make more room for


the nerves using a laminotomy.





Laminectomy: Sometimes, a laminotomy is not sufficient.  The surgeon may need to


remove all or part of the lamina.  This procedure is called a laminectomy.  

This can 


often be done at many levels without any harmful effects.





Current Hospital Diet


Patient's current hospital diet: Regular Diet





Discharge Diet


Recommended Diet:  Regular Diet





Procedures


Procedures Performed:  


L5-S1 Posterior Lumbar Interbody Fusion





Pending Studies


Studies pending at discharge:  no





Medical Emergencies








.


Who to Call and When:





Medical Emergencies:  If at any time you feel your situation is an emergency, 

please call 911 immediately.





.





Non-Emergent Contact


Non-Emergency issues call your:  Surgeon


.








"Provider Documentation" section prepared by Natalio Wharton.








.





VTE Core Measure


Inpt VTE Proph given/why not?:  Treatment not indicated

## 2017-09-19 NOTE — ANESTHESIOLOGY PROGRESS NOTE
Anesthesia Post Op Note


Date & Time


Sep 19, 2017 at 08:58





Vital Signs


Pain Intensity:  6.0





Vital Signs Past 12 Hours








  Date Time  Temp Pulse Resp B/P (MAP) Pulse Ox O2 Delivery O2 Flow Rate FiO2


 


9/19/17 07:59      Room Air  


 


9/19/17 07:39 36.8 120 14 128/77 (94) 98 Room Air  





  80      


 


9/19/17 03:38 36.8 105 16 104/63 (77) 100 Room Air  


 


9/18/17 23:40      Room Air  


 


9/18/17 22:58 36.9 105 14 117/61 (79) 99 Room Air  











Notes


Mental Status:  alert / awake / arousable, participated in evaluation


Pt Amnestic to Procedure:  Yes


Nausea / Vomiting:  adequately controlled


Pain:  adequately controlled


Airway Patency, RR, SpO2:  stable & adequate


BP & HR:  stable & adequate


Hydration State:  stable & adequate


Anesthetic Complications:  no major complications apparent

## 2017-09-20 VITALS
TEMPERATURE: 98.06 F | OXYGEN SATURATION: 97 % | DIASTOLIC BLOOD PRESSURE: 69 MMHG | SYSTOLIC BLOOD PRESSURE: 118 MMHG | HEART RATE: 103 BPM

## 2017-09-20 VITALS
HEART RATE: 103 BPM | DIASTOLIC BLOOD PRESSURE: 69 MMHG | SYSTOLIC BLOOD PRESSURE: 118 MMHG | OXYGEN SATURATION: 97 % | TEMPERATURE: 98.06 F

## 2017-09-20 RX ADMIN — Medication SCH GM: at 08:38

## 2017-09-20 RX ADMIN — BUPROPION HYDROCHLORIDE SCH MG: 150 TABLET, FILM COATED, EXTENDED RELEASE ORAL at 08:38

## 2017-09-20 RX ADMIN — HYDROMORPHONE HYDROCHLORIDE PRN MG: 1 INJECTION, SOLUTION INTRAMUSCULAR; INTRAVENOUS; SUBCUTANEOUS at 10:15

## 2017-09-20 RX ADMIN — TOPIRAMATE SCH MG: 25 TABLET, FILM COATED ORAL at 09:17

## 2017-09-20 RX ADMIN — ACETAMINOPHEN SCH MLS/HR: 10 INJECTION, SOLUTION INTRAVENOUS at 08:39

## 2017-09-20 RX ADMIN — ACETAMINOPHEN SCH MLS/HR: 10 INJECTION, SOLUTION INTRAVENOUS at 00:09

## 2017-09-20 RX ADMIN — HYDROMORPHONE HYDROCHLORIDE PRN MG: 1 INJECTION, SOLUTION INTRAMUSCULAR; INTRAVENOUS; SUBCUTANEOUS at 00:10

## 2017-09-20 RX ADMIN — HYDROMORPHONE HYDROCHLORIDE PRN MG: 1 INJECTION, SOLUTION INTRAMUSCULAR; INTRAVENOUS; SUBCUTANEOUS at 06:23

## 2017-09-20 RX ADMIN — DEXAMETHASONE SODIUM PHOSPHATE SCH MLS/MIN: 4 INJECTION, SOLUTION INTRA-ARTICULAR; INTRALESIONAL; INTRAMUSCULAR; INTRAVENOUS; SOFT TISSUE at 01:05

## 2017-09-27 NOTE — DISCHARGE SUMMARY
She is improved, stable.  Rounded on each and every day.

 

OBJECTIVE:  Vital signs are stable.  Alert, oriented.  Mentation normal, no

confusion.

 

No chest pain or shortness of breath.

 

ASSESSMENT:  Status post lumbar spine reconstructive surgery.

 

DISPOSITION:  She will be discharged home.  Instructions, precautions

provided.  Medication provided.  She has a followup appointment. 

Instructions given in the office and here at the hospital.

## 2018-02-17 ENCOUNTER — HOSPITAL ENCOUNTER (EMERGENCY)
Dept: HOSPITAL 45 - C.EDB | Age: 33
Discharge: HOME | End: 2018-02-17
Payer: COMMERCIAL

## 2018-02-17 VITALS — TEMPERATURE: 97.88 F

## 2018-02-17 VITALS — DIASTOLIC BLOOD PRESSURE: 73 MMHG | OXYGEN SATURATION: 100 % | HEART RATE: 90 BPM | SYSTOLIC BLOOD PRESSURE: 118 MMHG

## 2018-02-17 VITALS
HEIGHT: 67.01 IN | BODY MASS INDEX: 31.66 KG/M2 | HEIGHT: 67.01 IN | BODY MASS INDEX: 31.66 KG/M2 | WEIGHT: 201.72 LBS | WEIGHT: 201.72 LBS

## 2018-02-17 DIAGNOSIS — Z84.1: ICD-10-CM

## 2018-02-17 DIAGNOSIS — R00.0: ICD-10-CM

## 2018-02-17 DIAGNOSIS — Z82.0: ICD-10-CM
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
## 2018-02-17 DIAGNOSIS — E83.59: ICD-10-CM

## 2018-02-17 DIAGNOSIS — F41.8: ICD-10-CM

## 2018-02-17 DIAGNOSIS — S90.31XA: ICD-10-CM

## 2018-02-17 DIAGNOSIS — N12: Primary | ICD-10-CM

## 2018-02-17 DIAGNOSIS — J45.909: ICD-10-CM

## 2018-02-17 DIAGNOSIS — R11.0: ICD-10-CM

## 2018-02-17 LAB
ALBUMIN SERPL-MCNC: 3.8 GM/DL (ref 3.4–5)
ALP SERPL-CCNC: 86 U/L (ref 45–117)
ALT SERPL-CCNC: 38 U/L (ref 12–78)
AST SERPL-CCNC: 30 U/L (ref 15–37)
BASOPHILS # BLD: 0.05 K/UL (ref 0–0.2)
BASOPHILS NFR BLD: 0.6 %
BUN SERPL-MCNC: 14 MG/DL (ref 7–18)
CALCIUM SERPL-MCNC: 8.3 MG/DL (ref 8.5–10.1)
CO2 SERPL-SCNC: 23 MMOL/L (ref 21–32)
CREAT SERPL-MCNC: 1.04 MG/DL (ref 0.6–1.2)
EOS ABS #: 0.61 K/UL (ref 0–0.5)
EOSINOPHIL NFR BLD AUTO: 189 K/UL (ref 130–400)
GLUCOSE SERPL-MCNC: 101 MG/DL (ref 70–99)
HCT VFR BLD CALC: 37.8 % (ref 37–47)
HGB BLD-MCNC: 13.4 G/DL (ref 12–16)
IG#: 0.03 K/UL (ref 0–0.02)
IMM GRANULOCYTES NFR BLD AUTO: 32.5 %
LIPASE: 129 U/L (ref 73–393)
LYMPHOCYTES # BLD: 2.62 K/UL (ref 1.2–3.4)
MCH RBC QN AUTO: 31.4 PG (ref 25–34)
MCHC RBC AUTO-ENTMCNC: 35.4 G/DL (ref 32–36)
MCV RBC AUTO: 88.5 FL (ref 80–100)
MONO ABS #: 0.55 K/UL (ref 0.11–0.59)
MONOCYTES NFR BLD: 6.8 %
NEUT ABS #: 4.19 K/UL (ref 1.4–6.5)
NEUTROPHILS # BLD AUTO: 7.6 %
NEUTROPHILS NFR BLD AUTO: 52.1 %
PMV BLD AUTO: 10.2 FL (ref 7.4–10.4)
POTASSIUM SERPL-SCNC: 3.4 MMOL/L (ref 3.5–5.1)
PROT SERPL-MCNC: 7.2 GM/DL (ref 6.4–8.2)
RED CELL DISTRIBUTION WIDTH CV: 13 % (ref 11.5–14.5)
RED CELL DISTRIBUTION WIDTH SD: 41.9 FL (ref 36.4–46.3)
SODIUM SERPL-SCNC: 140 MMOL/L (ref 136–145)
WBC # BLD AUTO: 8.05 K/UL (ref 4.8–10.8)

## 2018-02-17 RX ADMIN — MORPHINE SULFATE PRN MG: 4 INJECTION, SOLUTION INTRAMUSCULAR; INTRAVENOUS at 19:13

## 2018-02-17 RX ADMIN — MORPHINE SULFATE PRN MG: 4 INJECTION, SOLUTION INTRAMUSCULAR; INTRAVENOUS at 21:55

## 2018-02-17 NOTE — EMERGENCY ROOM VISIT NOTE
History


First contact with patient:  18:38


Chief Complaint:  ABDOMINAL PAIN


Stated Complaint:  ABD PAIN, POSSIBLE BROKEN R FOOT


Nursing Triage Summary:  


pt reports low abdominal pain X 1 day difficulty urinating 





also 2L bottle of soda fell on R foot





History of Present Illness


The patient is a 32 year old female who presents to the Emergency Room with 

complaints of diffuse abdominal pain has gotten progressively worse over the 

last 4 days.  The pain started on the left side of her abdomen.  It is now 

radiating to the right side.  She describes as a sharp, razor blade sensation 

that is constant.  She has tried ibuprofen with minimal relief of the pain.  

Her last menses was a few days ago.  She has had mild nausea.  No vomiting.  

She has not had a bowel movement in 2 days, but this is normal for her.  She 

denies any fever or chills.  She is having some difficulty urinating.





Review of Systems


10 system review performed and  negative unless noted in HPI or below





Past Medical/Surgical History


Medical Problems:


(1) Anxiety


(2) Asthma


(3) Bronchitis


(4) decreased fetal movement


(5) Degenerative disc disease at L5-S1 level


(6) Depression


(7) intrau


Surgical Problems:


(1) History of tubal ligation


(2) History of wisdom tooth extraction








Family History





FH: cancer


FH: kidney disease


FH: seizures





Social History


Smoking Status:  Never Smoker


Alcohol Use:  none


Drug Use:  none


Marital Status:  


Housing Status:  lives with family


Occupation Status:  unemployed





Current/Historical Medications


Scheduled


Buspirone Hcl (Buspirone Hcl), 5 MG PO BID


Ciprofloxacin Hcl (Cipro), 500 MG PO BID


Gabapentin (Neurontin), 300 MG PO DAILY


Topiramate (Topamax), 100 MG PO BID


Venlafaxine Hcl (Effexor Xr), 1 CAP PO DAILY





Scheduled PRN


Albuterol (Ventolin Hfa), 2 PUFFS INH UD PRN for SOB/Wheezing


Cyclobenzaprine Hcl (Flexeril), 15 MG PO TID PRN for Muscle Spasm


Hydrocodone/Acetaminophen 5MG/325MG (Norco 5MG/325MG), 1-2 TABLET PO Q4H PRN 

for Pain


Ibuprofen (Advil), 400-600 MG PO Q6H PRN for Pain





Physical Exam


Vital Signs











  Date Time  Temp Pulse Resp B/P (MAP) Pulse Ox O2 Delivery O2 Flow Rate FiO2


 


2/17/18 23:20  90 20 118/73 100 Room Air  


 


2/17/18 22:54  99 20 128/83 100 Room Air  


 


2/17/18 21:50  100 20 127/73 100 Room Air  


 


2/17/18 20:30  81 20 127/73 99 Room Air  


 


2/17/18 19:12  102 20 122/68 100 Room Air  


 


2/17/18 18:16 36.6 121 20 129/78 96 Room Air  











Physical Exam


VITALS: Vitals are noted on the nurse's note and reviewed by myself.  Vital 

signs stable.


GENERAL: 32-year-old female, in no acute distress, nondiaphoretic, well-

developed well-nourished.


SKIN: The skin was without rashes, erythema, edema, or bruising. 


HEAD: Normocephalic atraumatic.  


MOUTH: Mucous membranes slightly dry  


NECK: Supple without nuchal rigidity.  No lymphadenopathy. Cervical spine is 

nontender.  No JVD.


HEART: Regular rate and rhythm without murmurs gallops or rubs.


LUNGS: Clear to auscultation bilaterally without wheezes, rales or rhonchi.   

No accessory muscle use.


ABDOMEN: Bowel sounds present, but hypoactive.  Mild left-sided CVA tenderness 

noted..Soft, mild tenderness to palpation in the left lower quadrant, without 

organomegaly. No guarding or rebound tenderness.


MUSCULOSKELETAL: 


Mild ecchymosis noted on the dorsal aspect of the right foot.  Sensation in the 

toes is intact.  Capillary refill less than 2 seconds.  DP pulse +2.  No 

tenderness over the ankle.  Strength 5/5 throughout.


NEURO: Patient was alert and oriented to person place and time.  Normal 

sensation to touch. No focal neurological deficits.





Medical Decision & Procedures


ER Provider


Diagnostic Interpretation:


foot xray


IMPRESSION: No acute fracture or dislocation within the right foot.











Electronically signed by:  Akira Mccrary M.D.


2/17/2018 7:50 PM





Dictated Date/Time:  2/17/2018 7:48 PM





 The status of this report is Signed.   


 Draft = Not yet reviewed or approved by Radiologist.  


 Signed = Reviewed and approved by Radiologist.





renal US





                                                                               

                                                                 








IMPRESSION: 





1. No hydronephrosis.





2. Findings raising the possibility of medullary nephrocalcinosis.











Electronically signed by:  Akira Mccrary M.D.


2/17/2018 10:17 PM





Dictated Date/Time:  2/17/2018 10:15 PM





 The status of this report is Signed.   


 Draft = Not yet reviewed or approved by Radiologist.  


 Signed = Reviewed and approved by Radiologist.


<AttendingPhy></AttendingPhy> <FamilyPhy>Josie Carreno C.R.N.P</FamilyPhy> <

PrimaryPhy>Josie Carreno C.R.N.P</PrimaryPhy> <UnitNumber>[id number]</

UnitNumber> <VisitNumber>[id number]</VisitNumber> <PatientName>ZHOU SIMMS</PatientName> <DateOfBirth>1985</DateOfBirth> <Location>[facility]</Location

> <ServiceDate>02/17/18</ServiceDate> <MNE>ESINDI</MNE> <OrderingPhy>Remedios Cordova PA-C</OrderingPhy> <OrderingPhyMNE>f rep ord dr adkins</OrderingPhyMNE> <

DictatingPhyMNE>f rep dict dr adkins</DictatingPhyMNE> <CCListMNE>f rep ct mne</

CCListMNE> <AdmittingPhyMNE>f pt admit dr adkins</AdmittingPhyMNE> <AttendingPhyMNE

>f pt attend dr adkins</AttendingPhyMNE>


<ConsultingPhyMNE>f pt consult dr adkins</ConsultingPhyMNE> <FamilyPhyMNE>f pt fam 

dr adkins</FamilyPhyMNE> <OtherPhyMNE>f pt other dr adkins</OtherPhyMNE> <

PrimaryPhyMNE>f pt prim care dr adkins</PrimaryPhyMNE> <ReferringPhyMNE>f pt 

referring dr adkins</ReferringPhyMNE>





Laboratory Results


2/17/18 19:10








Red Blood Count 4.27, Mean Corpuscular Volume 88.5, Mean Corpuscular Hemoglobin 

31.4, Mean Corpuscular Hemoglobin Concent 35.4, Mean Platelet Volume 10.2, 

Neutrophils (%) (Auto) 52.1, Lymphocytes (%) (Auto) 32.5, Monocytes (%) (Auto) 

6.8, Eosinophils (%) (Auto) 7.6, Basophils (%) (Auto) 0.6, Neutrophils # (Auto) 

4.19, Lymphocytes # (Auto) 2.62, Monocytes # (Auto) 0.55, Eosinophils # (Auto) 

0.61, Basophils # (Auto) 0.05





2/17/18 19:10

















Test


  2/17/18


19:10 2/17/18


19:15


 


White Blood Count


  8.05 K/uL


(4.8-10.8) 


 


 


Red Blood Count


  4.27 M/uL


(4.2-5.4) 


 


 


Hemoglobin


  13.4 g/dL


(12.0-16.0) 


 


 


Hematocrit 37.8 % (37-47)  


 


Mean Corpuscular Volume


  88.5 fL


() 


 


 


Mean Corpuscular Hemoglobin


  31.4 pg


(25-34) 


 


 


Mean Corpuscular Hemoglobin


Concent 35.4 g/dl


(32-36) 


 


 


Platelet Count


  189 K/uL


(130-400) 


 


 


Mean Platelet Volume


  10.2 fL


(7.4-10.4) 


 


 


Neutrophils (%) (Auto) 52.1 %  


 


Lymphocytes (%) (Auto) 32.5 %  


 


Monocytes (%) (Auto) 6.8 %  


 


Eosinophils (%) (Auto) 7.6 %  


 


Basophils (%) (Auto) 0.6 %  


 


Neutrophils # (Auto)


  4.19 K/uL


(1.4-6.5) 


 


 


Lymphocytes # (Auto)


  2.62 K/uL


(1.2-3.4) 


 


 


Monocytes # (Auto)


  0.55 K/uL


(0.11-0.59) 


 


 


Eosinophils # (Auto)


  0.61 K/uL


(0-0.5) 


 


 


Basophils # (Auto)


  0.05 K/uL


(0-0.2) 


 


 


RDW Standard Deviation


  41.9 fL


(36.4-46.3) 


 


 


RDW Coefficient of Variation


  13.0 %


(11.5-14.5) 


 


 


Immature Granulocyte % (Auto) 0.4 %  


 


Immature Granulocyte # (Auto)


  0.03 K/uL


(0.00-0.02) 


 


 


Anion Gap


  9.0 mmol/L


(3-11) 


 


 


Est Creatinine Clear Calc


Drug Dose 90.2 ml/min 


  


 


 


Estimated GFR (


American) 82.3 


  


 


 


Estimated GFR (Non-


American 71.0 


  


 


 


BUN/Creatinine Ratio 13.8 (10-20)  


 


Calcium Level


  8.3 mg/dl


(8.5-10.1) 


 


 


Total Bilirubin


  0.3 mg/dl


(0.2-1) 


 


 


Aspartate Amino Transf


(AST/SGOT) 30 U/L (15-37) 


  


 


 


Alanine Aminotransferase


(ALT/SGPT) 38 U/L (12-78) 


  


 


 


Alkaline Phosphatase


  86 U/L


() 


 


 


Total Protein


  7.2 gm/dl


(6.4-8.2) 


 


 


Albumin


  3.8 gm/dl


(3.4-5.0) 


 


 


Globulin


  3.4 gm/dl


(2.5-4.0) 


 


 


Albumin/Globulin Ratio 1.1 (0.9-2)  


 


Lipase


  129 U/L


() 


 


 


Human Chorionic Gonadotropin,


Qual NEG (NEG) 


  


 


 


Urine Color  YELLOW 


 


Urine Appearance  CLOUDY (CLEAR) 


 


Urine pH  5.0 (4.5-7.5) 


 


Urine Specific Gravity


  


  1.018


(1.000-1.030)


 


Urine Protein  NEG (NEG) 


 


Urine Glucose (UA)  NEG (NEG) 


 


Urine Ketones  NEG (NEG) 


 


Urine Occult Blood  1+ (NEG) 


 


Urine Nitrite  NEG (NEG) 


 


Urine Bilirubin  NEG (NEG) 


 


Urine Urobilinogen  NEG (NEG) 


 


Urine Leukocyte Esterase  LARGE (NEG) 


 


Urine WBC (Auto)  >30 /hpf (0-5) 


 


Urine RBC (Auto)


  


  5-10 /hpf


(0-4)


 


Urine Hyaline Casts (Auto)


  


  5-10 /lpf


(0-5)


 


Urine Epithelial Cells (Auto)  >30 /lpf (0-5) 


 


Urine Bacteria (Auto)  4+ (NEG) 











Medications Administered











 Medications


  (Trade)  Dose


 Ordered  Sig/Adan


 Route  Start Time


 Stop Time Status Last Admin


Dose Admin


 


 Morphine Sulfate


  (MoRPHine


 SULFATE INJ)  4 mg  Q1H  PRN


 IV  2/17/18 19:00


 2/18/18 00:31 DC 2/17/18 21:55


4 MG


 


 Ondansetron HCl


  (Zofran Inj)  4 mg  Q2H  PRN


 IV  2/17/18 19:00


 2/18/18 00:31 DC 2/17/18 19:13


4 MG


 


 Sodium Chloride  500 ml @ 


 999 mls/hr  Q31M STAT


 IV  2/17/18 18:49


 2/17/18 19:19 DC 2/17/18 19:14


999 MLS/HR


 


 Ciprofloxacin/


 Dextrose


  (Cipro / D5W)  400 mg  NOW  STAT


 IV  2/17/18 20:13


 2/17/18 20:17 DC 2/17/18 20:29


400 MG


 


 Sodium Chloride  500 ml @ 


 999 mls/hr  Q31M STAT


 IV  2/17/18 22:33


 2/17/18 23:03 DC 2/17/18 22:33


999 MLS/HR


 


 Acetaminophen/


 Hydrocodone Bitart


  (Norco 5/325mg


 Home Pack)  1 [facility]  UD  ONCE


 PO  2/17/18 22:45


 2/17/18 22:46 DC 2/17/18 23:20


1 HOMEPACK











ED Course


Patient was seen and examined


Vital signs including  blood pressure were reviewed


medications list was verified with patient


Labs were obtained, and a saline lock was established


The patient was medicated with morphine and Zofran.  She was hydrated with 500 

mL normal saline.


The patient's workup was reviewed.  She was reassessed.  She was more 

comfortable.  We discuss her results.  She voiced understanding.


The patient was given 1 dose of Cipro 400 mg IV.  She did require an additional 

dose of morphine.


The patient was still slightly tachycardic.  She was given an additional 500 mL 

normal saline bolus, which improved her heart rate.


The patient was given a home pack of Norco.


I reviewed discharge instructions the patient.  They voiced understanding and 

had no further questions.





Medical Decision


Differential diagnosis: Ureteral stone, pyelonephritis, UTI, interstitial 

cystitis, pancreatitis, gastritis, bowel obstruction, constipation, foot 

contusion/fracture





At this patient is a 32-year-old female that presents to the emergency 

department with worsening abdominal pain and urinary symptoms over the last 4 

days.  She also dropped a 2 L soda bottle on her foot tonight.  On exam, she 

was nontoxic in appearance.  She had mild left-sided CVA tenderness.  Her 

workup reveals no leukocytosis.  Her renal function is intact.  It does appear 

that she has a UTI.  Given this and her slight CVA tenderness, this is likely a 

pyelonephritis.  I ordered an ultrasound to evaluate her for nephritic stranding

/abscess.  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  There were no other 

acute findings.  She is otherwise healthy.  I believe she is stable to be 

discharged home on oral antibiotics for 2 weeks.  She was comfortable with this 

plan.  She will be given a short course of narcotic pain medication and was 

encouraged to follow-up within the next 24-48 hours with her primary care 

physician.  She also agrees to return to the emergency department with any new, 

worsening or concerning symptoms





This chart was completed in part utilizing Dragon Speech Voice Recognition 

software. Attempts were made to minimize the grammatical errors, random word 

insertions, pronoun errors and incomplete sentences. Any formal questions or 

concerns about the content, text or information contained within the body of 

this dictation should be directly addressed to the provider for clarification.





Medication Reconcilliation


Current Medication List:  was personally reviewed by me





Blood Pressure Screening


Patient's blood pressure:  Normal blood pressure





Impression





 Primary Impression:  


 Pyelonephritis





Departure Information


Dispostion


Home / Self-Care





Condition


FAIR





Prescriptions





Hydrocodone/Acetaminophen 5MG/325MG (Norco 5MG/325MG)  Tab


1-2 TABLET PO Q4H Y for Pain, #10 TAB


   For Initial Treatment


   Prov: Remedios Cordova PA-C         2/17/18 


Ciprofloxacin Hcl (CIPRO) 500 Mg Tab


500 MG PO BID for 14 Days, #28 TAB


   Prov: Remedios Cordova PA-C         2/17/18





Referrals


Josie Carreno C.R.NJAIDA (PCP)





Patient Instructions


My [facility], Pyelonephritis - [facility]





Additional Instructions





You have been evaluated in the emergency department for flank/abdominal pain.  

It appears that you have a urinary tract infection and likely pyelonephritis, 

which is an infection of the kidney.





It is very important for you to finish the entire course of antibiotics





Ibuprofen 600 mg every 8 hours as needed for pain


Norco 1-2 tabs every 4 hours for severe pain.  Do not drink alcohol or drive  

while taking this medication.  This may be taken with ibuprofen, but avoid 

Tylenol.





Please increase fluids over the next several days





Please follow-up with your primary care physician as soon as possible.  Call 

Monday morning for a follow-up appointment.





Please do not hesitate to return to the emergency department with any new, 

worsening or concerning symptoms; especially, fever, worsening pain or vomiting





It was a pleasure participating in your care





School Instructions


Return To School:  2 days

## 2018-02-17 NOTE — DIAGNOSTIC IMAGING REPORT
R FOOT MIN 3 VIEWS ROUTINE



CLINICAL HISTORY: dropped soda bottle on R foot    



COMPARISON: None



FINDINGS:  The tarsometatarsal joints are intact. There is no acute fracture

within the right foot.



IMPRESSION: No acute fracture or dislocation within the right foot.







Electronically signed by:  Akira Mccrary M.D.

2/17/2018 7:50 PM



Dictated Date/Time:  2/17/2018 7:48 PM

## 2018-02-17 NOTE — DIAGNOSTIC IMAGING REPORT
RENAL ULTRASOUND



CLINICAL HISTORY: L flank pain, UTI ? pyelo    



COMPARISON STUDY:  None.



TECHNIQUE:  Sonography of the kidneys and the urinary bladder was performed.



FINDINGS: The right kidney measures 11 x 4.1 x 6.6 cm and the left measures 11.1

x 5 x 6.2 cm. There is no hydronephrosis. There is apparent renal cortical

thinning with increased echogenicity of the renal pyramids. No shadowing calculi

are identified. The right ureteral jet was not visualized. The left ureteral jet

was identified.



IMPRESSION: 



1. No hydronephrosis.



2. Findings raising the possibility of medullary nephrocalcinosis.







Electronically signed by:  Akira Mccrary M.D.

2/17/2018 10:17 PM



Dictated Date/Time:  2/17/2018 10:15 PM

## 2018-02-23 ENCOUNTER — HOSPITAL ENCOUNTER (OUTPATIENT)
Dept: HOSPITAL 45 - C.ULTR | Age: 33
Discharge: HOME | End: 2018-02-23
Attending: NURSE PRACTITIONER
Payer: COMMERCIAL

## 2018-02-23 DIAGNOSIS — R10.31: Primary | ICD-10-CM

## 2018-02-23 NOTE — DIAGNOSTIC IMAGING REPORT
PELVIC ULTRASOUND



CLINICAL HISTORY: Lower quadrant abdominal pain. Evaluate for ectopic pregnancy

versus ovarian cyst.    



COMPARISON STUDY:  Pelvic ultrasound March 4, 2012.



TECHNIQUE:  Transabdominal and transvaginal sonography of the pelvis was

performed.



FINDINGS: The uterus measures 9.3 x 4.9 x 4.9 cm. No intrauterine gestational

sac is noted. Endometrium measures 1.4 cm in thickness. The left ovary was not

visualized. No adnexal mass was identified. The right ovary measures 4 x 2.6 x

3.1 cm and contains a dominant follicle and measures 2.4 cm. There is no free

fluid.



IMPRESSION: 



1. No intrauterine gestational sac or adnexal mass identified. Correlation with

beta hCG levels is recommended. If positive, serial beta hCG levels and a

follow-up pelvic ultrasound are recommended.



2. Nonvisualization of the left ovary.



3. Dominant follicle within the right ovary.







Electronically signed by:  Akira Mccrary M.D.

2/23/2018 3:46 PM



Dictated Date/Time:  2/23/2018 3:43 PM

## 2018-03-11 ENCOUNTER — HOSPITAL ENCOUNTER (EMERGENCY)
Dept: HOSPITAL 45 - C.EDB | Age: 33
Discharge: HOME | End: 2018-03-11
Payer: COMMERCIAL

## 2018-03-11 VITALS — HEART RATE: 88 BPM | DIASTOLIC BLOOD PRESSURE: 80 MMHG | SYSTOLIC BLOOD PRESSURE: 114 MMHG | OXYGEN SATURATION: 100 %

## 2018-03-11 VITALS
HEIGHT: 67.01 IN | WEIGHT: 204.81 LBS | WEIGHT: 204.81 LBS | BODY MASS INDEX: 32.15 KG/M2 | BODY MASS INDEX: 32.15 KG/M2 | HEIGHT: 67.01 IN

## 2018-03-11 VITALS — TEMPERATURE: 98.24 F

## 2018-03-11 DIAGNOSIS — Z82.0: ICD-10-CM

## 2018-03-11 DIAGNOSIS — Z84.1: ICD-10-CM

## 2018-03-11 DIAGNOSIS — J02.0: Primary | ICD-10-CM

## 2018-03-11 DIAGNOSIS — J45.909: ICD-10-CM

## 2018-03-11 LAB — INFLUENZA B ANTIGEN: (no result)

## 2018-03-11 NOTE — DIAGNOSTIC IMAGING REPORT
CHEST 2 VIEWS ROUTINE



CLINICAL HISTORY: cough x 2 weeks    



COMPARISON STUDY:  No previous studies for comparison.



FINDINGS: The cardiac and mediastinal contours are normal. There is no evidence

of focal pulmonary consolidation. There is no evidence of failure. No pleural

effusions are visualized.[ 



IMPRESSION: No active disease in the chest.







Electronically signed by:  Wilfrid Olivares M.D.

3/11/2018 11:21 AM



Dictated Date/Time:  3/11/2018 11:21 AM